# Patient Record
Sex: FEMALE | Race: WHITE | Employment: OTHER | URBAN - METROPOLITAN AREA
[De-identification: names, ages, dates, MRNs, and addresses within clinical notes are randomized per-mention and may not be internally consistent; named-entity substitution may affect disease eponyms.]

---

## 2022-08-12 ENCOUNTER — HOSPITAL ENCOUNTER (OUTPATIENT)
Facility: CLINIC | Age: 77
End: 2022-08-12
Attending: ORTHOPAEDIC SURGERY | Admitting: ORTHOPAEDIC SURGERY

## 2023-02-09 RX ORDER — MULTIVITAMIN,THER AND MINERALS
1 TABLET ORAL DAILY
COMMUNITY
End: 2023-02-09

## 2023-02-09 RX ORDER — DILTIAZEM HYDROCHLORIDE 120 MG/1
120 CAPSULE, EXTENDED RELEASE ORAL DAILY
COMMUNITY
Start: 2022-03-30

## 2023-02-09 RX ORDER — NAPROXEN SODIUM 220 MG
440 TABLET ORAL DAILY PRN
Status: ON HOLD | COMMUNITY
End: 2023-03-06

## 2023-02-09 RX ORDER — ALBUTEROL SULFATE 90 UG/1
2 AEROSOL, METERED RESPIRATORY (INHALATION) DAILY
COMMUNITY
Start: 2022-03-30

## 2023-02-09 RX ORDER — FLUTICASONE PROPIONATE AND SALMETEROL 250; 50 UG/1; UG/1
1 POWDER RESPIRATORY (INHALATION) 2 TIMES DAILY
COMMUNITY
Start: 2022-12-19

## 2023-02-09 RX ORDER — CHLORAL HYDRATE 500 MG
2 CAPSULE ORAL DAILY
COMMUNITY

## 2023-02-09 RX ORDER — MULTIVIT-MIN/IRON/FOLIC ACID/K 18-600-40
CAPSULE ORAL DAILY
COMMUNITY

## 2023-02-09 NOTE — PROVIDER NOTIFICATION
02/09/23 1217   Living Arrangements   Is your private residence a single family home or apartment? Single family home   Number of Stairs, Within Home, Primary greater than 10 stairs   Stair Railings, Within Home, Primary railings safe and in good condition   Once home, are you able to live on one level? Yes   Which level? Main Level   Bathroom Shower/Tub Walk-in shower   Support System   Do you have someone available to stay with you one or two nights once you are home? Yes   Medical Clearance   It is recommended that you call and check with any specialty providers before surgery to see if you need surgical clearance.  Do you see any specialty providers outside of your primary care provider? Yes  (pt will contact cardiology and COPD doctor)   Blood   Known Bleeding Disorder or Coagulopathy No   Does the patient have any Bahai/cultural preferences related to blood products? No   Education   Has the patient scheduled or completed pre-op total joint education, either in class or online, in the last 12 months? No   What day did the patient complete, or plan to complete, pre-op total joint education? 02/09/23  (will attempt to schedule class)   Patient attended total joint pre-op class/received pre-op teaching  online

## 2023-03-04 ENCOUNTER — NURSE TRIAGE (OUTPATIENT)
Dept: NURSING | Facility: CLINIC | Age: 78
End: 2023-03-04
Payer: MEDICARE

## 2023-03-04 NOTE — PHARMACY-ADMISSION MEDICATION HISTORY
Medication reconciliation interview completed by pre-admitting nurse     Reviewed by Barbara Reece, RN (Registered Nurse) on 02/09/23 at 1149      Reviewed by pharmacy. No further clarifications needed.       Prior to Admission medications    Medication Sig Last Dose Taking? Auth Provider Long Term End Date   albuterol (PROAIR HFA/PROVENTIL HFA/VENTOLIN HFA) 108 (90 Base) MCG/ACT inhaler Inhale 2 puffs into the lungs daily  Yes Reported, Patient Yes    diltiazem ER (DILT-XR) 120 MG 24 hr capsule Take 120 mg by mouth daily  Yes Reported, Patient Yes    fish oil-omega-3 fatty acids 1000 MG capsule Take 2 g by mouth daily  Yes Reported, Patient     fluticasone-salmeterol (ADVAIR) 250-50 MCG/ACT inhaler Inhale 1 puff into the lungs 2 times daily  Yes Reported, Patient Yes    Multiple Vitamins-Minerals (MULTIVITAMIN GUMMIES ADULTS PO) Take by mouth 2 times daily  Yes Reported, Patient     Multiple Vitamins-Minerals (PRESERVISION AREDS 2+MULTI VIT PO) Take by mouth 2 times daily  Yes Reported, Patient     naproxen sodium (ANAPROX) 220 MG tablet Take 440 mg by mouth daily as needed  Yes Reported, Patient     Vitamin D, Cholecalciferol, 25 MCG (1000 UT) TABS Take by mouth daily  Yes Reported, Patient     aspirin (ASA) 81 MG EC tablet Take 81 mg by mouth daily   Reported, Patient

## 2023-03-04 NOTE — PLAN OF CARE
Your Elective Patient:    Patient Name:  Jessica Mcdaniel    : 1945    MRN: [id number]    Surgeon: Dr Hinds    DOS/Procedure:  3/6/2023- L TKA    Hospital: [facility]    Insurance Medicare -[employer] ins.    PCP- [facility]         Assessment Findings: Will update with additional concerns from the H&P as indicated  Medical              BMI = 31.58              Covid Status-Yes              Age =77 (1pt)              A Fib - managed with ASA              COPD - inhaler - well managed         Functional              Currently Ambulation - no devices              Met score -denied any shortness of breath              Falls - 1 fall pulling a nail out and nail let go and she fell         Living Situation              Stairs to enter home -   3 steps from outside, 1 flight steps to lower level for laundry              Bathroom setup - walk-in shower, standard toilet         Post Op Support/Resources              Support - Her sister may stay with her if available              Transportation - daughter in-law will take her to the hospital - Son will take her to rehab center              Equipment available - shower seat, borrowed a walker    Discharge Disposition              TCU    1.     [facility]    2.     [facility]    3.     [facility]    4.     [facility]    5.     [facility]         Thank You!    Camille Austin RN    Trauma Coordinator         [facility]    [place]    [address] [facility] [address]   [place] [address]    T: [phone]    C. [phone]    F: [phone]    E: [email or web address]

## 2023-03-04 NOTE — TELEPHONE ENCOUNTER
Jessica is calling re: Pre surgery Covid PCR testing    She is scheduled for Rt TKA on Mon AM, 3/6/23 and will be staying overnight.  She did not realize that her Pre-op instructions from her [facility] surgeon included that she would need a Covid PCR test 4 days prior to surgery if she is to be staying overnight in the hospital.    She wants to try to get the test done with hopes that she could get results prior to Mon AM.   Notified that results can take up to 72 hrs and that she might Not have results in time.  She wants to try. She is prepared for surgery and does not want to reschedule because it took months for her to get this appointment.    5:31 pm - Spoke to Adalberto  - Covid PCR order placed  - Scheduled for soonest lab appt available near Jessica callejas [place] at [place] @ 1 pm    5:48 pm - Spoke to INES Resendez at [facility]  5:49 pm - Spoke to chantelle Carr desk at [facility] - no lab availability remaining today.    5:53 pm - Notified pt of no lab availability tonight at the [facility]  - Advised contacting [facility] to ask about possible pre-op Covid PCR testing  - Pt will check with another family member to find possible Rapid PCR testing options    Airam Villela RN  [facility] Nurse Advisors      Reason for Disposition    Question about upcoming scheduled test, no triage required and triager able to answer question    Protocols used: INFORMATION ONLY CALL - NO TRIAGE-A-

## 2023-03-05 ENCOUNTER — LAB (OUTPATIENT)
Dept: LAB | Facility: CLINIC | Age: 78
End: 2023-03-05
Payer: MEDICARE

## 2023-03-05 DIAGNOSIS — Z20.822 ENCOUNTER FOR LABORATORY TESTING FOR COVID-19 VIRUS: ICD-10-CM

## 2023-03-05 LAB — SARS-COV-2 RNA RESP QL NAA+PROBE: NEGATIVE

## 2023-03-05 PROCEDURE — U0003 INFECTIOUS AGENT DETECTION BY NUCLEIC ACID (DNA OR RNA); SEVERE ACUTE RESPIRATORY SYNDROME CORONAVIRUS 2 (SARS-COV-2) (CORONAVIRUS DISEASE [COVID-19]), AMPLIFIED PROBE TECHNIQUE, MAKING USE OF HIGH THROUGHPUT TECHNOLOGIES AS DESCRIBED BY CMS-2020-01-R: HCPCS

## 2023-03-05 PROCEDURE — U0005 INFEC AGEN DETEC AMPLI PROBE: HCPCS

## 2023-03-06 ENCOUNTER — APPOINTMENT (OUTPATIENT)
Dept: PHYSICAL THERAPY | Facility: CLINIC | Age: 78
End: 2023-03-06
Attending: ORTHOPAEDIC SURGERY
Payer: MEDICARE

## 2023-03-06 ENCOUNTER — ANESTHESIA (OUTPATIENT)
Dept: SURGERY | Facility: CLINIC | Age: 78
End: 2023-03-06
Payer: MEDICARE

## 2023-03-06 ENCOUNTER — APPOINTMENT (OUTPATIENT)
Dept: GENERAL RADIOLOGY | Facility: CLINIC | Age: 78
End: 2023-03-06
Attending: PHYSICIAN ASSISTANT
Payer: MEDICARE

## 2023-03-06 ENCOUNTER — HOSPITAL ENCOUNTER (OUTPATIENT)
Facility: CLINIC | Age: 78
Discharge: NURSING, REHAB OR LONG-TERM CARE | End: 2023-03-07
Attending: ORTHOPAEDIC SURGERY | Admitting: ORTHOPAEDIC SURGERY
Payer: MEDICARE

## 2023-03-06 ENCOUNTER — ANESTHESIA EVENT (OUTPATIENT)
Dept: SURGERY | Facility: CLINIC | Age: 78
End: 2023-03-06
Payer: MEDICARE

## 2023-03-06 DIAGNOSIS — Z96.651 TOTAL KNEE REPLACEMENT STATUS, RIGHT: Primary | ICD-10-CM

## 2023-03-06 LAB — GLUCOSE BLDC GLUCOMTR-MCNC: 94 MG/DL (ref 70–99)

## 2023-03-06 PROCEDURE — 258N000003 HC RX IP 258 OP 636: Performed by: ANESTHESIOLOGY

## 2023-03-06 PROCEDURE — 710N000009 HC RECOVERY PHASE 1, LEVEL 1, PER MIN: Performed by: ORTHOPAEDIC SURGERY

## 2023-03-06 PROCEDURE — 360N000077 HC SURGERY LEVEL 4, PER MIN: Performed by: ORTHOPAEDIC SURGERY

## 2023-03-06 PROCEDURE — 250N000013 HC RX MED GY IP 250 OP 250 PS 637: Performed by: PHYSICIAN ASSISTANT

## 2023-03-06 PROCEDURE — 97530 THERAPEUTIC ACTIVITIES: CPT | Mod: GP | Performed by: PHYSICAL THERAPIST

## 2023-03-06 PROCEDURE — 250N000025 HC SEVOFLURANE, PER MIN: Performed by: ORTHOPAEDIC SURGERY

## 2023-03-06 PROCEDURE — 250N000011 HC RX IP 250 OP 636: Performed by: ANESTHESIOLOGY

## 2023-03-06 PROCEDURE — 999N000141 HC STATISTIC PRE-PROCEDURE NURSING ASSESSMENT: Performed by: ORTHOPAEDIC SURGERY

## 2023-03-06 PROCEDURE — 250N000011 HC RX IP 250 OP 636: Performed by: PHYSICIAN ASSISTANT

## 2023-03-06 PROCEDURE — 250N000009 HC RX 250: Performed by: ORTHOPAEDIC SURGERY

## 2023-03-06 PROCEDURE — 250N000011 HC RX IP 250 OP 636: Performed by: ORTHOPAEDIC SURGERY

## 2023-03-06 PROCEDURE — 370N000017 HC ANESTHESIA TECHNICAL FEE, PER MIN: Performed by: ORTHOPAEDIC SURGERY

## 2023-03-06 PROCEDURE — C1713 ANCHOR/SCREW BN/BN,TIS/BN: HCPCS | Performed by: ORTHOPAEDIC SURGERY

## 2023-03-06 PROCEDURE — 82962 GLUCOSE BLOOD TEST: CPT

## 2023-03-06 PROCEDURE — C1776 JOINT DEVICE (IMPLANTABLE): HCPCS | Performed by: ORTHOPAEDIC SURGERY

## 2023-03-06 PROCEDURE — 250N000009 HC RX 250: Performed by: NURSE ANESTHETIST, CERTIFIED REGISTERED

## 2023-03-06 PROCEDURE — 97110 THERAPEUTIC EXERCISES: CPT | Mod: GP | Performed by: PHYSICAL THERAPIST

## 2023-03-06 PROCEDURE — 999N000065 XR KNEE PORT RIGHT 1/2 VIEWS: Mod: RT

## 2023-03-06 PROCEDURE — 250N000009 HC RX 250: Performed by: ANESTHESIOLOGY

## 2023-03-06 PROCEDURE — 250N000011 HC RX IP 250 OP 636: Performed by: NURSE ANESTHETIST, CERTIFIED REGISTERED

## 2023-03-06 PROCEDURE — 272N000001 HC OR GENERAL SUPPLY STERILE: Performed by: ORTHOPAEDIC SURGERY

## 2023-03-06 PROCEDURE — 250N000009 HC RX 250: Performed by: PHYSICIAN ASSISTANT

## 2023-03-06 PROCEDURE — 99222 1ST HOSP IP/OBS MODERATE 55: CPT | Performed by: PHYSICIAN ASSISTANT

## 2023-03-06 PROCEDURE — 258N000003 HC RX IP 258 OP 636: Performed by: NURSE ANESTHETIST, CERTIFIED REGISTERED

## 2023-03-06 PROCEDURE — 258N000003 HC RX IP 258 OP 636: Performed by: PHYSICIAN ASSISTANT

## 2023-03-06 PROCEDURE — 97161 PT EVAL LOW COMPLEX 20 MIN: CPT | Mod: GP | Performed by: PHYSICAL THERAPIST

## 2023-03-06 DEVICE — IMPLANTABLE DEVICE: Type: IMPLANTABLE DEVICE | Site: KNEE | Status: FUNCTIONAL

## 2023-03-06 DEVICE — BONE CEMENT SIMPLEX FULL DOSE 6191-1-001: Type: IMPLANTABLE DEVICE | Site: KNEE | Status: FUNCTIONAL

## 2023-03-06 DEVICE — IMP PATELLA ZIM KNEE ALL POLLY 32MM 42-5400-000-32: Type: IMPLANTABLE DEVICE | Site: KNEE | Status: FUNCTIONAL

## 2023-03-06 DEVICE — KNEE TIBIA STEM 5DEG SZ F R 42532007502: Type: IMPLANTABLE DEVICE | Site: KNEE | Status: FUNCTIONAL

## 2023-03-06 RX ORDER — AMOXICILLIN 250 MG
1 CAPSULE ORAL 2 TIMES DAILY
Status: DISCONTINUED | OUTPATIENT
Start: 2023-03-06 | End: 2023-03-07 | Stop reason: HOSPADM

## 2023-03-06 RX ORDER — FLUTICASONE FUROATE AND VILANTEROL 100; 25 UG/1; UG/1
1 POWDER RESPIRATORY (INHALATION) DAILY
Status: DISCONTINUED | OUTPATIENT
Start: 2023-03-06 | End: 2023-03-07 | Stop reason: HOSPADM

## 2023-03-06 RX ORDER — HYDROXYZINE HYDROCHLORIDE 10 MG/1
10 TABLET, FILM COATED ORAL EVERY 6 HOURS PRN
Status: DISCONTINUED | OUTPATIENT
Start: 2023-03-06 | End: 2023-03-07 | Stop reason: HOSPADM

## 2023-03-06 RX ORDER — DILTIAZEM HYDROCHLORIDE 120 MG/1
120 CAPSULE, COATED, EXTENDED RELEASE ORAL DAILY
Status: DISCONTINUED | OUTPATIENT
Start: 2023-03-07 | End: 2023-03-07 | Stop reason: HOSPADM

## 2023-03-06 RX ORDER — DIPHENHYDRAMINE HCL 12.5MG/5ML
12.5 LIQUID (ML) ORAL EVERY 6 HOURS PRN
Status: DISCONTINUED | OUTPATIENT
Start: 2023-03-06 | End: 2023-03-06 | Stop reason: HOSPADM

## 2023-03-06 RX ORDER — FENTANYL CITRATE 50 UG/ML
25 INJECTION, SOLUTION INTRAMUSCULAR; INTRAVENOUS EVERY 5 MIN PRN
Status: DISCONTINUED | OUTPATIENT
Start: 2023-03-06 | End: 2023-03-06 | Stop reason: HOSPADM

## 2023-03-06 RX ORDER — ACETAMINOPHEN 325 MG/1
975 TABLET ORAL EVERY 8 HOURS
Status: DISCONTINUED | OUTPATIENT
Start: 2023-03-06 | End: 2023-03-07 | Stop reason: HOSPADM

## 2023-03-06 RX ORDER — HYDROMORPHONE HCL IN WATER/PF 6 MG/30 ML
0.2 PATIENT CONTROLLED ANALGESIA SYRINGE INTRAVENOUS
Status: DISCONTINUED | OUTPATIENT
Start: 2023-03-06 | End: 2023-03-07 | Stop reason: HOSPADM

## 2023-03-06 RX ORDER — CALCIUM CARBONATE 500 MG/1
500 TABLET, CHEWABLE ORAL 4 TIMES DAILY PRN
Status: DISCONTINUED | OUTPATIENT
Start: 2023-03-06 | End: 2023-03-07 | Stop reason: HOSPADM

## 2023-03-06 RX ORDER — OXYCODONE HYDROCHLORIDE 5 MG/1
5 TABLET ORAL EVERY 4 HOURS PRN
Status: DISCONTINUED | OUTPATIENT
Start: 2023-03-06 | End: 2023-03-07 | Stop reason: HOSPADM

## 2023-03-06 RX ORDER — ONDANSETRON 4 MG/1
4 TABLET, ORALLY DISINTEGRATING ORAL EVERY 30 MIN PRN
Status: DISCONTINUED | OUTPATIENT
Start: 2023-03-06 | End: 2023-03-06 | Stop reason: HOSPADM

## 2023-03-06 RX ORDER — NALOXONE HYDROCHLORIDE 0.4 MG/ML
0.4 INJECTION, SOLUTION INTRAMUSCULAR; INTRAVENOUS; SUBCUTANEOUS
Status: DISCONTINUED | OUTPATIENT
Start: 2023-03-06 | End: 2023-03-07 | Stop reason: HOSPADM

## 2023-03-06 RX ORDER — SODIUM CHLORIDE, SODIUM LACTATE, POTASSIUM CHLORIDE, CALCIUM CHLORIDE 600; 310; 30; 20 MG/100ML; MG/100ML; MG/100ML; MG/100ML
INJECTION, SOLUTION INTRAVENOUS CONTINUOUS
Status: DISCONTINUED | OUTPATIENT
Start: 2023-03-06 | End: 2023-03-07 | Stop reason: HOSPADM

## 2023-03-06 RX ORDER — ACETAMINOPHEN 325 MG/1
975 TABLET ORAL ONCE
Status: DISCONTINUED | OUTPATIENT
Start: 2023-03-06 | End: 2023-03-06 | Stop reason: HOSPADM

## 2023-03-06 RX ORDER — LIDOCAINE 40 MG/G
CREAM TOPICAL
Status: DISCONTINUED | OUTPATIENT
Start: 2023-03-06 | End: 2023-03-06 | Stop reason: HOSPADM

## 2023-03-06 RX ORDER — HYDROMORPHONE HCL IN WATER/PF 6 MG/30 ML
0.2 PATIENT CONTROLLED ANALGESIA SYRINGE INTRAVENOUS EVERY 5 MIN PRN
Status: DISCONTINUED | OUTPATIENT
Start: 2023-03-06 | End: 2023-03-06 | Stop reason: HOSPADM

## 2023-03-06 RX ORDER — DIPHENHYDRAMINE HYDROCHLORIDE 50 MG/ML
12.5 INJECTION INTRAMUSCULAR; INTRAVENOUS EVERY 6 HOURS PRN
Status: DISCONTINUED | OUTPATIENT
Start: 2023-03-06 | End: 2023-03-06 | Stop reason: HOSPADM

## 2023-03-06 RX ORDER — DIAZEPAM 10 MG/2ML
2.5 INJECTION, SOLUTION INTRAMUSCULAR; INTRAVENOUS
Status: DISCONTINUED | OUTPATIENT
Start: 2023-03-06 | End: 2023-03-06 | Stop reason: HOSPADM

## 2023-03-06 RX ORDER — IBUPROFEN 600 MG/1
600 TABLET, FILM COATED ORAL EVERY 6 HOURS PRN
Status: DISCONTINUED | OUTPATIENT
Start: 2023-03-06 | End: 2023-03-07 | Stop reason: HOSPADM

## 2023-03-06 RX ORDER — ONDANSETRON 2 MG/ML
4 INJECTION INTRAMUSCULAR; INTRAVENOUS EVERY 30 MIN PRN
Status: DISCONTINUED | OUTPATIENT
Start: 2023-03-06 | End: 2023-03-06 | Stop reason: HOSPADM

## 2023-03-06 RX ORDER — FENTANYL CITRATE 50 UG/ML
INJECTION, SOLUTION INTRAMUSCULAR; INTRAVENOUS PRN
Status: DISCONTINUED | OUTPATIENT
Start: 2023-03-06 | End: 2023-03-06

## 2023-03-06 RX ORDER — AMOXICILLIN 250 MG
1-2 CAPSULE ORAL 2 TIMES DAILY
Qty: 30 TABLET | Refills: 0 | Status: SHIPPED | OUTPATIENT
Start: 2023-03-06

## 2023-03-06 RX ORDER — LABETALOL HYDROCHLORIDE 5 MG/ML
10 INJECTION, SOLUTION INTRAVENOUS
Status: DISCONTINUED | OUTPATIENT
Start: 2023-03-06 | End: 2023-03-06 | Stop reason: HOSPADM

## 2023-03-06 RX ORDER — PROPOFOL 10 MG/ML
INJECTION, EMULSION INTRAVENOUS CONTINUOUS PRN
Status: DISCONTINUED | OUTPATIENT
Start: 2023-03-06 | End: 2023-03-06

## 2023-03-06 RX ORDER — POLYETHYLENE GLYCOL 3350 17 G/17G
1 POWDER, FOR SOLUTION ORAL DAILY
Qty: 10 PACKET | Refills: 0 | Status: SHIPPED | OUTPATIENT
Start: 2023-03-06

## 2023-03-06 RX ORDER — CEFAZOLIN SODIUM/WATER 2 G/20 ML
2 SYRINGE (ML) INTRAVENOUS SEE ADMIN INSTRUCTIONS
Status: DISCONTINUED | OUTPATIENT
Start: 2023-03-06 | End: 2023-03-06 | Stop reason: HOSPADM

## 2023-03-06 RX ORDER — METOPROLOL TARTRATE 1 MG/ML
2.5 INJECTION, SOLUTION INTRAVENOUS EVERY 4 HOURS PRN
Status: DISCONTINUED | OUTPATIENT
Start: 2023-03-06 | End: 2023-03-07 | Stop reason: HOSPADM

## 2023-03-06 RX ORDER — OXYCODONE HYDROCHLORIDE 5 MG/1
5-10 TABLET ORAL EVERY 4 HOURS PRN
Qty: 30 TABLET | Refills: 0 | Status: SHIPPED | OUTPATIENT
Start: 2023-03-06

## 2023-03-06 RX ORDER — ONDANSETRON 2 MG/ML
INJECTION INTRAMUSCULAR; INTRAVENOUS PRN
Status: DISCONTINUED | OUTPATIENT
Start: 2023-03-06 | End: 2023-03-06

## 2023-03-06 RX ORDER — DIMENHYDRINATE 50 MG/ML
25 INJECTION, SOLUTION INTRAMUSCULAR; INTRAVENOUS
Status: DISCONTINUED | OUTPATIENT
Start: 2023-03-06 | End: 2023-03-06 | Stop reason: HOSPADM

## 2023-03-06 RX ORDER — HYDROMORPHONE HCL IN WATER/PF 6 MG/30 ML
0.4 PATIENT CONTROLLED ANALGESIA SYRINGE INTRAVENOUS
Status: DISCONTINUED | OUTPATIENT
Start: 2023-03-06 | End: 2023-03-07 | Stop reason: HOSPADM

## 2023-03-06 RX ORDER — TRANEXAMIC ACID 650 MG/1
1950 TABLET ORAL ONCE
Status: COMPLETED | OUTPATIENT
Start: 2023-03-06 | End: 2023-03-06

## 2023-03-06 RX ORDER — ALBUTEROL SULFATE 90 UG/1
2 AEROSOL, METERED RESPIRATORY (INHALATION) EVERY 4 HOURS PRN
Status: DISCONTINUED | OUTPATIENT
Start: 2023-03-06 | End: 2023-03-07 | Stop reason: HOSPADM

## 2023-03-06 RX ORDER — ONDANSETRON 4 MG/1
4 TABLET, ORALLY DISINTEGRATING ORAL EVERY 6 HOURS PRN
Status: DISCONTINUED | OUTPATIENT
Start: 2023-03-06 | End: 2023-03-07 | Stop reason: HOSPADM

## 2023-03-06 RX ORDER — LIDOCAINE HYDROCHLORIDE 20 MG/ML
INJECTION, SOLUTION INFILTRATION; PERINEURAL PRN
Status: DISCONTINUED | OUTPATIENT
Start: 2023-03-06 | End: 2023-03-06

## 2023-03-06 RX ORDER — ALBUTEROL SULFATE 0.83 MG/ML
2.5 SOLUTION RESPIRATORY (INHALATION) EVERY 4 HOURS PRN
Status: DISCONTINUED | OUTPATIENT
Start: 2023-03-06 | End: 2023-03-06 | Stop reason: HOSPADM

## 2023-03-06 RX ORDER — PROCHLORPERAZINE MALEATE 5 MG
5 TABLET ORAL EVERY 6 HOURS PRN
Status: DISCONTINUED | OUTPATIENT
Start: 2023-03-06 | End: 2023-03-07 | Stop reason: HOSPADM

## 2023-03-06 RX ORDER — FENTANYL CITRATE 50 UG/ML
50 INJECTION, SOLUTION INTRAMUSCULAR; INTRAVENOUS EVERY 5 MIN PRN
Status: DISCONTINUED | OUTPATIENT
Start: 2023-03-06 | End: 2023-03-06 | Stop reason: HOSPADM

## 2023-03-06 RX ORDER — LIDOCAINE 40 MG/G
CREAM TOPICAL
Status: DISCONTINUED | OUTPATIENT
Start: 2023-03-06 | End: 2023-03-07 | Stop reason: HOSPADM

## 2023-03-06 RX ORDER — HYDROMORPHONE HCL IN WATER/PF 6 MG/30 ML
0.4 PATIENT CONTROLLED ANALGESIA SYRINGE INTRAVENOUS EVERY 5 MIN PRN
Status: DISCONTINUED | OUTPATIENT
Start: 2023-03-06 | End: 2023-03-06 | Stop reason: HOSPADM

## 2023-03-06 RX ORDER — SODIUM CHLORIDE, SODIUM LACTATE, POTASSIUM CHLORIDE, CALCIUM CHLORIDE 600; 310; 30; 20 MG/100ML; MG/100ML; MG/100ML; MG/100ML
INJECTION, SOLUTION INTRAVENOUS CONTINUOUS PRN
Status: DISCONTINUED | OUTPATIENT
Start: 2023-03-06 | End: 2023-03-06

## 2023-03-06 RX ORDER — OXYCODONE HYDROCHLORIDE 10 MG/1
10 TABLET ORAL EVERY 4 HOURS PRN
Status: DISCONTINUED | OUTPATIENT
Start: 2023-03-06 | End: 2023-03-07 | Stop reason: HOSPADM

## 2023-03-06 RX ORDER — POLYETHYLENE GLYCOL 3350 17 G/17G
17 POWDER, FOR SOLUTION ORAL DAILY
Status: DISCONTINUED | OUTPATIENT
Start: 2023-03-07 | End: 2023-03-07 | Stop reason: HOSPADM

## 2023-03-06 RX ORDER — ONDANSETRON 2 MG/ML
4 INJECTION INTRAMUSCULAR; INTRAVENOUS EVERY 6 HOURS PRN
Status: DISCONTINUED | OUTPATIENT
Start: 2023-03-06 | End: 2023-03-07 | Stop reason: HOSPADM

## 2023-03-06 RX ORDER — PROPOFOL 10 MG/ML
INJECTION, EMULSION INTRAVENOUS PRN
Status: DISCONTINUED | OUTPATIENT
Start: 2023-03-06 | End: 2023-03-06

## 2023-03-06 RX ORDER — NALOXONE HYDROCHLORIDE 0.4 MG/ML
0.2 INJECTION, SOLUTION INTRAMUSCULAR; INTRAVENOUS; SUBCUTANEOUS
Status: DISCONTINUED | OUTPATIENT
Start: 2023-03-06 | End: 2023-03-07 | Stop reason: HOSPADM

## 2023-03-06 RX ORDER — ACETAMINOPHEN 325 MG/1
975 TABLET ORAL ONCE
Status: COMPLETED | OUTPATIENT
Start: 2023-03-06 | End: 2023-03-06

## 2023-03-06 RX ORDER — HYDRALAZINE HYDROCHLORIDE 20 MG/ML
2.5-5 INJECTION INTRAMUSCULAR; INTRAVENOUS EVERY 10 MIN PRN
Status: DISCONTINUED | OUTPATIENT
Start: 2023-03-06 | End: 2023-03-06 | Stop reason: HOSPADM

## 2023-03-06 RX ORDER — HALOPERIDOL 5 MG/ML
1 INJECTION INTRAMUSCULAR
Status: DISCONTINUED | OUTPATIENT
Start: 2023-03-06 | End: 2023-03-06 | Stop reason: HOSPADM

## 2023-03-06 RX ORDER — BISACODYL 10 MG
10 SUPPOSITORY, RECTAL RECTAL DAILY PRN
Status: DISCONTINUED | OUTPATIENT
Start: 2023-03-06 | End: 2023-03-07 | Stop reason: HOSPADM

## 2023-03-06 RX ORDER — ACETAMINOPHEN 325 MG/1
650 TABLET ORAL EVERY 4 HOURS PRN
Qty: 100 TABLET | Refills: 0 | Status: SHIPPED | OUTPATIENT
Start: 2023-03-06

## 2023-03-06 RX ORDER — VANCOMYCIN HYDROCHLORIDE 1 G/20ML
INJECTION, POWDER, LYOPHILIZED, FOR SOLUTION INTRAVENOUS PRN
Status: DISCONTINUED | OUTPATIENT
Start: 2023-03-06 | End: 2023-03-06 | Stop reason: HOSPADM

## 2023-03-06 RX ORDER — CEFAZOLIN SODIUM/WATER 2 G/20 ML
2 SYRINGE (ML) INTRAVENOUS
Status: COMPLETED | OUTPATIENT
Start: 2023-03-06 | End: 2023-03-06

## 2023-03-06 RX ORDER — TRANEXAMIC ACID 10 MG/ML
1 INJECTION, SOLUTION INTRAVENOUS ONCE
Status: COMPLETED | OUTPATIENT
Start: 2023-03-06 | End: 2023-03-06

## 2023-03-06 RX ORDER — SODIUM CHLORIDE, SODIUM LACTATE, POTASSIUM CHLORIDE, CALCIUM CHLORIDE 600; 310; 30; 20 MG/100ML; MG/100ML; MG/100ML; MG/100ML
INJECTION, SOLUTION INTRAVENOUS CONTINUOUS
Status: DISCONTINUED | OUTPATIENT
Start: 2023-03-06 | End: 2023-03-06 | Stop reason: HOSPADM

## 2023-03-06 RX ORDER — METHYLPREDNISOLONE ACETATE 40 MG/ML
INJECTION, SUSPENSION INTRA-ARTICULAR; INTRALESIONAL; INTRAMUSCULAR; SOFT TISSUE PRN
Status: DISCONTINUED | OUTPATIENT
Start: 2023-03-06 | End: 2023-03-06

## 2023-03-06 RX ORDER — ASPIRIN 81 MG/1
81 TABLET ORAL DAILY
COMMUNITY

## 2023-03-06 RX ORDER — DEXAMETHASONE SODIUM PHOSPHATE 4 MG/ML
INJECTION, SOLUTION INTRA-ARTICULAR; INTRALESIONAL; INTRAMUSCULAR; INTRAVENOUS; SOFT TISSUE PRN
Status: DISCONTINUED | OUTPATIENT
Start: 2023-03-06 | End: 2023-03-06

## 2023-03-06 RX ORDER — CEFAZOLIN SODIUM 2 G/100ML
2 INJECTION, SOLUTION INTRAVENOUS EVERY 8 HOURS
Status: COMPLETED | OUTPATIENT
Start: 2023-03-06 | End: 2023-03-07

## 2023-03-06 RX ORDER — KETOROLAC TROMETHAMINE 30 MG/ML
INJECTION, SOLUTION INTRAMUSCULAR; INTRAVENOUS PRN
Status: DISCONTINUED | OUTPATIENT
Start: 2023-03-06 | End: 2023-03-06

## 2023-03-06 RX ORDER — HYDROXYZINE HYDROCHLORIDE 10 MG/1
10 TABLET, FILM COATED ORAL EVERY 6 HOURS PRN
Qty: 30 TABLET | Refills: 0 | Status: SHIPPED | OUTPATIENT
Start: 2023-03-06

## 2023-03-06 RX ORDER — BUPIVACAINE HYDROCHLORIDE AND EPINEPHRINE 5; 5 MG/ML; UG/ML
INJECTION, SOLUTION PERINEURAL PRN
Status: DISCONTINUED | OUTPATIENT
Start: 2023-03-06 | End: 2023-03-06

## 2023-03-06 RX ORDER — IPRATROPIUM BROMIDE AND ALBUTEROL SULFATE 2.5; .5 MG/3ML; MG/3ML
3 SOLUTION RESPIRATORY (INHALATION)
Status: DISCONTINUED | OUTPATIENT
Start: 2023-03-06 | End: 2023-03-06

## 2023-03-06 RX ADMIN — PROPOFOL 150 MG: 10 INJECTION, EMULSION INTRAVENOUS at 08:46

## 2023-03-06 RX ADMIN — PHENYLEPHRINE HYDROCHLORIDE 100 MCG: 10 INJECTION INTRAVENOUS at 09:23

## 2023-03-06 RX ADMIN — OXYCODONE HYDROCHLORIDE 5 MG: 5 TABLET ORAL at 11:44

## 2023-03-06 RX ADMIN — TRANEXAMIC ACID 1 G: 10 INJECTION, SOLUTION INTRAVENOUS at 09:54

## 2023-03-06 RX ADMIN — KETOROLAC TROMETHAMINE 15 MG: 30 INJECTION, SOLUTION INTRAMUSCULAR at 08:46

## 2023-03-06 RX ADMIN — SODIUM CHLORIDE, POTASSIUM CHLORIDE, SODIUM LACTATE AND CALCIUM CHLORIDE: 600; 310; 30; 20 INJECTION, SOLUTION INTRAVENOUS at 08:43

## 2023-03-06 RX ADMIN — BUPIVACAINE HYDROCHLORIDE AND EPINEPHRINE BITARTRATE 20 ML: 5; .005 INJECTION, SOLUTION PERINEURAL at 08:02

## 2023-03-06 RX ADMIN — METHYLPREDNISOLONE ACETATE 40 MG: 40 INJECTION, SUSPENSION INTRA-ARTICULAR; INTRALESIONAL; INTRAMUSCULAR; SOFT TISSUE at 08:01

## 2023-03-06 RX ADMIN — TRANEXAMIC ACID 1950 MG: 650 TABLET ORAL at 07:07

## 2023-03-06 RX ADMIN — PHENYLEPHRINE HYDROCHLORIDE 100 MCG: 10 INJECTION INTRAVENOUS at 09:35

## 2023-03-06 RX ADMIN — DEXAMETHASONE SODIUM PHOSPHATE 8 MG: 4 INJECTION, SOLUTION INTRA-ARTICULAR; INTRALESIONAL; INTRAMUSCULAR; INTRAVENOUS; SOFT TISSUE at 08:46

## 2023-03-06 RX ADMIN — PHENYLEPHRINE HYDROCHLORIDE 200 MCG: 10 INJECTION INTRAVENOUS at 09:32

## 2023-03-06 RX ADMIN — PHENYLEPHRINE HYDROCHLORIDE 100 MCG: 10 INJECTION INTRAVENOUS at 09:44

## 2023-03-06 RX ADMIN — ONDANSETRON 4 MG: 2 INJECTION INTRAMUSCULAR; INTRAVENOUS at 08:46

## 2023-03-06 RX ADMIN — PROPOFOL 70 MG: 10 INJECTION, EMULSION INTRAVENOUS at 09:05

## 2023-03-06 RX ADMIN — SODIUM CHLORIDE, POTASSIUM CHLORIDE, SODIUM LACTATE AND CALCIUM CHLORIDE: 600; 310; 30; 20 INJECTION, SOLUTION INTRAVENOUS at 11:14

## 2023-03-06 RX ADMIN — FENTANYL CITRATE 100 MCG: 50 INJECTION, SOLUTION INTRAMUSCULAR; INTRAVENOUS at 07:58

## 2023-03-06 RX ADMIN — MIDAZOLAM 2 MG: 1 INJECTION INTRAMUSCULAR; INTRAVENOUS at 07:58

## 2023-03-06 RX ADMIN — LIDOCAINE HYDROCHLORIDE 40 MG: 20 INJECTION, SOLUTION INFILTRATION; PERINEURAL at 08:46

## 2023-03-06 RX ADMIN — PHENYLEPHRINE HYDROCHLORIDE 100 MCG: 10 INJECTION INTRAVENOUS at 08:56

## 2023-03-06 RX ADMIN — CEFAZOLIN SODIUM 2 G: 2 INJECTION, SOLUTION INTRAVENOUS at 15:32

## 2023-03-06 RX ADMIN — OXYCODONE HYDROCHLORIDE 5 MG: 5 TABLET ORAL at 11:14

## 2023-03-06 RX ADMIN — FLUTICASONE FUROATE AND VILANTEROL TRIFENATATE 1 PUFF: 100; 25 POWDER RESPIRATORY (INHALATION) at 21:39

## 2023-03-06 RX ADMIN — SODIUM CHLORIDE, POTASSIUM CHLORIDE, SODIUM LACTATE AND CALCIUM CHLORIDE: 600; 310; 30; 20 INJECTION, SOLUTION INTRAVENOUS at 21:27

## 2023-03-06 RX ADMIN — SODIUM CHLORIDE, POTASSIUM CHLORIDE, SODIUM LACTATE AND CALCIUM CHLORIDE: 600; 310; 30; 20 INJECTION, SOLUTION INTRAVENOUS at 07:43

## 2023-03-06 RX ADMIN — PHENYLEPHRINE HYDROCHLORIDE 100 MCG: 10 INJECTION INTRAVENOUS at 09:48

## 2023-03-06 RX ADMIN — ACETAMINOPHEN 975 MG: 325 TABLET ORAL at 15:32

## 2023-03-06 RX ADMIN — Medication 2 G: at 08:43

## 2023-03-06 RX ADMIN — PROPOFOL 50 MCG/KG/MIN: 10 INJECTION, EMULSION INTRAVENOUS at 08:46

## 2023-03-06 RX ADMIN — SENNOSIDES AND DOCUSATE SODIUM 1 TABLET: 50; 8.6 TABLET ORAL at 21:34

## 2023-03-06 RX ADMIN — BUPIVACAINE HYDROCHLORIDE AND EPINEPHRINE BITARTRATE 10 ML: 5; .005 INJECTION, SOLUTION PERINEURAL at 08:07

## 2023-03-06 RX ADMIN — ACETAMINOPHEN 975 MG: 325 TABLET ORAL at 07:07

## 2023-03-06 RX ADMIN — ROCURONIUM BROMIDE 10 MG: 50 INJECTION, SOLUTION INTRAVENOUS at 08:46

## 2023-03-06 RX ADMIN — HYDROMORPHONE HYDROCHLORIDE 1 MG: 1 INJECTION, SOLUTION INTRAMUSCULAR; INTRAVENOUS; SUBCUTANEOUS at 08:46

## 2023-03-06 ASSESSMENT — ENCOUNTER SYMPTOMS: DYSRHYTHMIAS: 1

## 2023-03-06 ASSESSMENT — ACTIVITIES OF DAILY LIVING (ADL)
ADLS_ACUITY_SCORE: 22
ADLS_ACUITY_SCORE: 22
ADLS_ACUITY_SCORE: 24
ADLS_ACUITY_SCORE: 23

## 2023-03-06 ASSESSMENT — COPD QUESTIONNAIRES
COPD: 1
CAT_SEVERITY: MODERATE

## 2023-03-06 NOTE — CONSULTS
Care Management Follow Up    Length of Stay (days): 0    Expected Discharge Date: 03/07/2023     Concerns to be Addressed:       Patient plan of care discussed at interdisciplinary rounds: Yes    Referrals Placed by CM/SW:    Private pay costs discussed: Not applicable    Additional Information:    Pt has a [facility] care plan. [facility], [facility], and [facility] per care plan. Pt son will provide transport upon discharge. Pt prefers a private room and is aware of potential cost. Will need to resend referral once therapy notes are in.     IVETT Reilly, [facility]  Inpatient Care Coordination  Ortho/Spine Unit  [phone]  Antonella Swenson, SW

## 2023-03-06 NOTE — BRIEF OP NOTE
TKR for Pre/Post OA knee  Guzman  TT est 35 w  (see dictation for full)  Spec none  No anticipated complications

## 2023-03-06 NOTE — OP NOTE
Procedure Date: 03/06/2023    PREOPERATIVE DIAGNOSES:    1.  Osteoarthritis, right knee.  2.  Peripheral edema, right lower extremity.    POSTOPERATIVE DIAGNOSES:    1.  Osteoarthritis, right knee.  2.  Peripheral edema, right lower extremity.    PROCEDURE PERFORMED:  Right total knee arthroplasty.    SURGEON:  Jimmy Hinds M.D.     ASSISTANT:  Adriane Mcghee PA-C    ANESTHESIA:  General with adductor block.    ESTIMATED BLOOD LOSS:  50 mL    TOURNIQUET TIME:  38 minutes.    COMPLICATIONS:  None.    DESCRIPTION OF PROCEDURE:  The patient was taken to the operating room where after administration of antibiotic prophylaxis, tranexamic acid and sterile prep and drape, the leg was exsanguinated and an anterior incision was made, followed by a medial arthrotomy with poor distal medial soft tissue thickness.  An intramedullary 5-degree guide was used with anterior referencing at 5 degrees of external rotation, which best matched the epicondylar axis, and a box cut was made for PCL substitution.  Retractors were carefully placed about the proximal tibia and a cut was made perpendicular to the mechanical axis of the tibia, removing no bone from the posteromedial tibia.  Medial osteophytes were removed.  Posterior osteophytes were also removed under direct vision with the knee in hyperflexion and a capsular injection was performed.  Eight mm was resected from the undersurface of a 21 mm patella and sized appropriately.  Capsular injection was performed.  Gaps were equal.    After copious lavage and drying, Simplex cementing was performed for a Surinder Persona size 9 standard with cruciate substituting femur, size F tibia, 12 mm polyethylene insert, 32 mm patellar button.  Range of motion, balance and tracking all appeared excellent with no laxity at any point of the flexion arc.  Copious additional lavage was performed followed by a Betadine soak and 1 gram of vancomycin powder due to her distal edema.  Layered  anatomic closure was then accomplished.  There were no complications.    Jimmy Hinds MD        D: 2023   T: 2023   MT: HERMELINDO    Name:     DESMOND VITALE  MRN:      [id number]        Account:        [id number]   :      1945           Procedure Date: 2023     Document: [id number]

## 2023-03-06 NOTE — ANESTHESIA CARE TRANSFER NOTE
Patient: Jessica Mcdaniel    Procedure: Procedure(s):  Right total knee arthroplasty(Spinal with adductor canal block)       Diagnosis: Osteoarthritis [M19.90]  Diagnosis Additional Information: No value filed.    Anesthesia Type:   General     Note:    Oropharynx: oropharynx clear of all foreign objects and spontaneously breathing  Level of Consciousness: awake  Oxygen Supplementation: face mask    Independent Airway: airway patency satisfactory and stable  Dentition: dentition unchanged  Vital Signs Stable: post-procedure vital signs reviewed and stable  Report to RN Given: handoff report given  Patient transferred to: PACU  Comments: Report and signed off to RN in PACU.  Good Resps, skin pink, VSS, O2 via face tent.  Handoff Report: Identifed the Patient, Identified the Reponsible Provider, Reviewed the pertinent medical history, Discussed the surgical course, Reviewed Intra-OP anesthesia mangement and issues during anesthesia, Set expectations for post-procedure period and Allowed opportunity for questions and acknowledgement of understanding      Vitals:  Vitals Value Taken Time   /99 03/06/23 1016   Temp     Pulse 129 03/06/23 1019   Resp 11 03/06/23 1019   SpO2 100 % 03/06/23 1019   Vitals shown include unvalidated device data.    Electronically Signed By: SUMAN Pearson CRNA  March 6, 2023  10:20 AM

## 2023-03-06 NOTE — ANESTHESIA PREPROCEDURE EVALUATION
Anesthesia Pre-Procedure Evaluation    Patient: Jessica Mcdaniel   MRN: [id number] : 1945        Procedure : Procedure(s):  Right total knee arthroplasty(Spinal with adductor canal block)          Past Medical History:   Diagnosis Date     Arrhythmia     afib     COPD (chronic obstructive pulmonary disease) (H)      Diabetes (H)     Pre-diabetes      Past Surgical History:   Procedure Laterality Date     BREAST SURGERY Right     Biopsy     CHOLECYSTECTOMY  2018     ORTHOPEDIC SURGERY Bilateral 2022    ORIF elbow right arm and radius fracture wrist left      Allergies   Allergen Reactions     Sulfa Drugs GI Disturbance, Other (See Comments) and Nausea     Abdominal pain      Social History     Tobacco Use     Smoking status: Former     Types: Cigarettes     Quit date:      Years since quittin.1     Smokeless tobacco: Never   Substance Use Topics     Alcohol use: Yes     Comment: rarely      Wt Readings from Last 1 Encounters:   23 84 kg (185 lb 3.2 oz)        Anesthesia Evaluation   Pt has had prior anesthetic. Type: General.    No history of anesthetic complications       ROS/MED HX  ENT/Pulmonary:     (+) moderate,  COPD,     Neurologic:  - neg neurologic ROS     Cardiovascular:     (+) hypertension-----dysrhythmias, a-fib,     METS/Exercise Tolerance:     Hematologic:  - neg hematologic  ROS     Musculoskeletal:   (+) arthritis,     GI/Hepatic:  - neg GI/hepatic ROS     Renal/Genitourinary:  - neg Renal ROS     Endo: Comment: Class `1 obesity    (+) type II DM, Not using insulin, - not using insulin pump. Obesity,     Psychiatric/Substance Use:  - neg psychiatric ROS     Infectious Disease:  - neg infectious disease ROS     Malignancy:  - neg malignancy ROS     Other:  - neg other ROS          Physical Exam    Airway        Mallampati: II   TM distance: > 3 FB   Neck ROM: full   Mouth opening: > 3 cm    Respiratory Devices and Support         Dental       (+) Modest Abnormalities -  crowns, retainers, 1 or 2 missing teeth      Cardiovascular   cardiovascular exam normal       Rhythm and rate: regular and normal     Pulmonary   pulmonary exam normal        breath sounds clear to auscultation       Other findings: No lab results found.   Lab Test        03/06/23 0628          GLC          94                   EKG Interpretation:   AF with no other abnl    OUTSIDE LABS:  CBC: No results found for: WBC, HGB, HCT, PLT  BMP:   Lab Results   Component Value Date    GLC 94 03/06/2023     COAGS: No results found for: PTT, INR, FIBR  POC: No results found for: BGM, HCG, HCGS  HEPATIC: No results found for: ALBUMIN, PROTTOTAL, ALT, AST, GGT, ALKPHOS, BILITOTAL, BILIDIRECT, KASIE  OTHER: No results found for: PH, LACT, A1C, BAUDILIO, PHOS, MAG, LIPASE, AMYLASE, TSH, T4, T3, CRP, SED    Anesthesia Plan    ASA Status:  3   NPO Status:  NPO Appropriate    Anesthesia Type: General.     - Airway: LMA   Induction: Intravenous.   Maintenance: Balanced.        Consents    Anesthesia Plan(s) and associated risks, benefits, and realistic alternatives discussed. Questions answered and patient/representative(s) expressed understanding.     - Discussed: Risks, Benefits and Alternatives for BOTH SEDATION and the PROCEDURE were discussed     - Discussed with:  Patient      - Extended Intubation/Ventilatory Support Discussed: No.      - Patient is DNR/DNI Status: No    Use of blood products discussed: No .     Postoperative Care    Pain management: IV analgesics, Oral pain medications, Peripheral nerve block (Continuous), Multi-modal analgesia.   PONV prophylaxis: Ondansetron (or other 5HT-3), Dexamethasone or Solumedrol, Background Propofol Infusion     Comments:                Aly Lees MD

## 2023-03-06 NOTE — ANESTHESIA PROCEDURE NOTES
Other (IPAKS) Procedure Note    Pre-Procedure   Staff -        Anesthesiologist:  Aly Lees MD       Performed By: anesthesiologist       Referred By: mark       Location: pre-op       Pre-Anesthestic Checklist: patient identified, IV checked, site marked, risks and benefits discussed, informed consent, monitors and equipment checked, pre-op evaluation, at physician/surgeon's request and post-op pain management  Timeout:       Correct Patient: Yes        Correct Procedure: Yes        Correct Site: Yes        Correct Position: Yes        Correct Laterality: Yes        Site Marked: Yes  Procedure Documentation  Procedure: Other (IPAKS)       Laterality: right       Patient Position: LLD       Patient Prep/Sterile Barriers: sterile gloves, mask       Skin prep: Betadine       Local skin infiltrated with 0.3 mL of 1% lidocaine.        Needle Type: insulated and short bevel       Needle Gauge: 21.        Needle Length (Inches): 4        Ultrasound guided       1. Ultrasound was used to identify targeted nerve, plexus, vascular marker, or fascial plane and place a needle adjacent to it in real-time.       2. Ultrasound was used to visualize the spread of anesthetic in close proximity to the above referenced structure.       4. The visualized anatomic structures appeared normal.       5. There were no apparent abnormal pathologic findings.    Assessment/Narrative         The placement was negative for: blood aspirated, painful injection and site bleeding       Paresthesias: No.       Test dose of 3 mL at.        .       Bolus given via needle..        Secured via.        Insertion/Infusion Method: Single Shot       Complications: none       Injection made incrementally with aspirations every 5 mL.     Comments:  The surgeon has given a written order transferring care of this patient to me for the performance of a regional analgesia block for post-op pain control. It is requested of me because I am  "uniquely trained and qualified to perform this block and the surgeon is neither trained nor qualified to perform this procedure.         FOR [facility] ONLY:   Pain Team Contact information: please page the Pain Team Via [employer] Search \"Pain\". During daytime hours, please page the attending first. At night please page the resident first.    "

## 2023-03-06 NOTE — CONSULTS
[facility]  Hospitalist Consult Note  Name: Jessica Mcdaniel    MRN: [id number]  YOB: 1945    Age: 77 year old  Date of admission: 3/6/2023  Primary care provider: Reggie Martinez     Requesting Physician:  Taz SAHU  Reason for consult:  Post-operative medical management         Assessment and Plan:   Jessica Mcdaniel is a 77 year old female with a history of atrial fibrillation not on anticoagulation, COPD, HLD, who was admitted for right TKA.    DJD s/p R TKA on 3/6/2023: The patient is doing well, currently has well controlled pain and is hemodynamically stable. Will defer diet, activity, DVT prophylaxis, and pain control to the primary team. Currently the patient is on Tylenol, oxycodone. Continue physical and occupational therapy. Social work consulted for possible placement needs. Continue incentive spirometry and check hemoglobin to evaluate for surgical blood loss and potential need for transfusion.     Chronic atrial fibrillation not on anticoagulation  HLD  Rate controlled by exam. Per discussion with patient and review of cardiology and PCP notes patient was recommended to start on anticoagulation which she has refused and has had an extensive discussion with cardiologist about it is aware of the risks and was on baby aspirin prior to hospitalization for surgery.  -If she is agreeable I would recommend full dose anticoagulation in the form of DOAC until return to normal mobility  -Continue rate control with Cardizem daily  -Follow up with cardiologist as previously directed.  -If uncontrolled resting heart rates place on telemetry    COPD  Emphysema  Appears compensated.  -Continue PTA inhalers    Prediabetes   Last a1c 5.8, would not check routine blood sugars at this time if fasting sugar above 150.  Follow-up with primary care to recheck A1c.    Code status: Full  Prophylaxis: pcd per primary   Disposition: patient states anticipates discharge to TCU    Thank you  for the consultation, we will continue to follow along during the hospitalization. Please page with any questions or concerns.         History of Present Illness:   Jessica Mcdaniel is a 77 year old female who was hospitalized for R TKA. Pre-operative note was fully reviewed and recommendations acknowledged. Op note and anesthesia notes and flow sheets reviewed.     The patient had no complications related to the procedure and has had an unremarkable post-operative course to this point. Currently pain is adequately controlled. No nausea, vomiting, diarrhea or constipation. No fevers, chills, diaphoresis. No chest pain, palpitations, dyspnea. Santoro catheter still in place. Tolerating oral intake. No excessive somnolence and patient is fully alert and oriented. The patient has no other complaints at this time.                  Past Medical History:     Past Medical History:   Diagnosis Date     Arrhythmia     afib     COPD (chronic obstructive pulmonary disease) (H)              Past Surgical History:     Past Surgical History:   Procedure Laterality Date     BREAST SURGERY Right     Biopsy     CHOLECYSTECTOMY  2018     ORTHOPEDIC SURGERY Bilateral 2022    ORIF elbow right arm and radius fracture wrist left               Social History:     Social History     Tobacco Use     Smoking status: Former     Types: Cigarettes     Quit date:      Years since quittin.1     Smokeless tobacco: Never   Substance Use Topics     Alcohol use: Yes     Comment: rarely             Family History:   Family history was fully reviewed and non-contributory in this case.          Allergies:     Allergies   Allergen Reactions     Sulfa Drugs GI Disturbance, Other (See Comments) and Nausea     Abdominal pain             Medications:     Prior to Admission medications    Medication Sig Last Dose Taking? Auth Provider Long Term End Date   acetaminophen (TYLENOL) 325 MG tablet Take 2 tablets (650 mg) by mouth every 4 hours as  needed for other (mild pain) May use regular strength (325mg) acetaminophen over the counter medication when Rx runs out.  Yes Adriane Mcghee PA-C     albuterol (PROAIR HFA/PROVENTIL HFA/VENTOLIN HFA) 108 (90 Base) MCG/ACT inhaler Inhale 2 puffs into the lungs daily More than a month Yes Reported, Patient Yes    aspirin (ASA) 325 MG EC tablet Take 1 tablet (325 mg) by mouth daily  Yes Adriane Mcghee PA-C No    aspirin 81 MG EC tablet Take 81 mg by mouth daily 3/4/2023 Yes Reported, Patient No    diltiazem ER (DILT-XR) 120 MG 24 hr capsule Take 120 mg by mouth daily 3/6/2023 Yes Reported, Patient Yes    fish oil-omega-3 fatty acids 1000 MG capsule Take 2 g by mouth daily 2/20/2023 Yes Reported, Patient     fluticasone-salmeterol (ADVAIR) 250-50 MCG/ACT inhaler Inhale 1 puff into the lungs 2 times daily 3/6/2023 Yes Reported, Patient Yes    hydrOXYzine (ATARAX) 10 MG tablet Take 1 tablet (10 mg) by mouth every 6 hours as needed for itching or anxiety (with pain, moderate pain)  Yes Adriane Mcghee PA-C     Multiple Vitamins-Minerals (MULTIVITAMIN GUMMIES ADULTS PO) Take by mouth 2 times daily 2/20/2023 Yes Reported, Patient     Multiple Vitamins-Minerals (PRESERVISION AREDS 2+MULTI VIT PO) Take by mouth 2 times daily 2/20/2023 Yes Reported, Patient     oxyCODONE (ROXICODONE) 5 MG tablet Take 1-2 tablets (5-10 mg) by mouth every 4 hours as needed for moderate to severe pain or severe pain (7-10) Begin weaning on POD3  Yes Adriane Mcghee PA-C     polyethylene glycol (MIRALAX) 17 g packet Take 17 g by mouth daily  Yes Adriane Mcghee PA-C     senna-docusate (SENOKOT-S/PERICOLACE) 8.6-50 MG tablet Take 1-2 tablets by mouth 2 times daily Take while on oral narcotics to prevent or treat constipation.  Yes Adriane Mcghee PA-C     Vitamin D (Cholecalciferol) 25 MCG (1000 UT) TABS Take by mouth daily 2/20/2023 Yes Reported, Patient         Current hospital administered medication  "list (MAR) also reviewed.          Review of Systems:     A comprehensive greater than 10 system review of systems was carried out.  Pertinent positives and negatives are noted above.  Otherwise negative for contributory info.            Physical Exam:   Blood pressure 136/79, pulse 86, temperature 97.3  F (36.3  C), temperature source Temporal, resp. rate 16, height 1.626 m (5' 4\"), weight 89.4 kg (197 lb), SpO2 97 %.  Exam:  General: Alert, awake, no acute distress.  HEENT: Normocephalic and atraumatic, eyes anicteric and without scleral injection, EOMI, face symmetric, MMM.  Cardiac: regular rate, rhythm irregular, normal S1, S2. No m/g/r, no LE edema.  Pulmonary: Normal chest rise, normal work of breathing.  Lungs CTAB without crackles or wheezing.  Abdomen: soft, non-tender, non-distended.  Normoactive bowel sounds, no guarding or rebound tenderness.  Extremities: no deformities.  Warm, well perfused.  Skin: no rashes or lesions.  Warm and Dry.  Neuro: No focal deficits.  Speech clear.  Spontaneously moving all extremities in bed.  Psych: Alert and oriented x3. Appropriate affect.          Data:   Pre op labs, hemoglobin and renal function normal.    Aniyah Choi PA-C  [facility] Hospitalist  March 6, 2023      "

## 2023-03-06 NOTE — PROGRESS NOTES
03/06/23 1626   Appointment Info   Signing Clinician's Name / Credentials (PT) Aly Davis DPT   Rehab Comments (PT) WBAT R LE   Living Environment   Living Environment Comments Pt lives in rambler home by self; 2 EMILY without railing.   Self-Care   Usual Activity Tolerance good   Current Activity Tolerance moderate   Equipment Currently Used at Home walker, rolling;grab bar, tub/shower;grab bar, toilet;raised toilet seat;shower chair   General Information   Onset of Illness/Injury or Date of Surgery 03/06/23   Referring Physician Adriane Mcghee PA-C   Patient/Family Therapy Goals Statement (PT) To improve mobility   Pertinent History of Current Problem (include personal factors and/or comorbidities that impact the POC) Per H&P, pt is a 77 year old female with a history of atrial fibrillation not on anticoagulation, COPD, HLD, who was admitted for right TKA.   Existing Precautions/Restrictions weight bearing   Weight-Bearing Status - RLE weight-bearing as tolerated   Cognition   Affect/Mental Status (Cognition) WFL   Orientation Status (Cognition) oriented x 4   Follows Commands (Cognition) WFL   Pain Assessment   Patient Currently in Pain No   Range of Motion (ROM)   ROM Comment R LE knee flexion decreased 2/2 surgical procedure   Strength (Manual Muscle Testing)   Strength Comments able to complete B SLR   Bed Mobility   Comment, (Bed Mobility) SBA supine <> sit   Transfers   Comment, (Transfers) CGA sit <> stand with FWW   Gait/Stairs (Locomotion)   Comment, (Gait/Stairs) CGA with FWW   Balance   Balance Comments good sitting; fair+ standing with FWW   Clinical Impression   Criteria for Skilled Therapeutic Intervention Yes, treatment indicated   PT Diagnosis (PT) impaired functional mobility   Influenced by the following impairments impaired R LE ROM/strength, impaired balance   Functional limitations due to impairments impaired bed mobility, transfers, ambulation, stairs   Clinical  Presentation (PT Evaluation Complexity) Stable/Uncomplicated   Clinical Presentation Rationale Pt is medically stable   Clinical Decision Making (Complexity) low complexity   Planned Therapy Interventions (PT) balance training;bed mobility training;cryotherapy;gait training;home exercise program;neuromuscular re-education;patient/family education;ROM (range of motion);stair training;strengthening;transfer training   Anticipated Equipment Needs at Discharge (PT) walker, rolling   Risk & Benefits of therapy have been explained evaluation/treatment results reviewed;care plan/treatment goals reviewed;risks/benefits reviewed;current/potential barriers reviewed;participants voiced agreement with care plan;participants included;patient   PT Total Evaluation Time   PT Eval, Low Complexity Minutes (31482) 10   Plan of Care Review   Plan of Care Reviewed With patient   Physical Therapy Goals   PT Frequency Daily   PT Predicted Duration/Target Date for Goal Attainment 03/07/23   PT Goals Bed Mobility;Transfers;Gait;Stairs   PT: Bed Mobility Modified independent;Supine to/from sit   PT: Transfers Modified independent;Sit to/from stand;Assistive device   PT: Gait Modified independent;Assistive device;150 feet   PT: Stairs Modified independent;3 stairs   PT Discharge Planning   PT Plan progress ind with mobility, HEP   PT Rationale for DC Rec Defer to ortho- pt lives alone and needs to be ind to return. Planned for TCU at discharge per care plan.   PT Brief overview of current status Ax1 with FWW   Total Session Time   Total Session Time (sum of timed and untimed services) 10

## 2023-03-06 NOTE — ANESTHESIA POSTPROCEDURE EVALUATION
Patient: Jessica Mcdaniel    Procedure: Procedure(s):  Right total knee arthroplasty(Spinal with adductor canal block)       Anesthesia Type:  General    Note:  Disposition: Inpatient   Postop Pain Control: Uneventful            Sign Out: Well controlled pain   PONV: No   Neuro/Psych: Uneventful            Sign Out: Acceptable/Baseline neuro status   Airway/Respiratory: Uneventful            Sign Out: Acceptable/Baseline resp. status   CV/Hemodynamics: Uneventful            Sign Out: Acceptable CV status; No obvious hypovolemia; No obvious fluid overload   Other NRE: NONE   DID A NON-ROUTINE EVENT OCCUR? No           Last vitals:  Vitals Value Taken Time   /91 03/06/23 1030   Temp 97.3  F (36.3  C) 03/06/23 1015   Pulse 96 03/06/23 1035   Resp 9 03/06/23 1035   SpO2 92 % 03/06/23 1035   Vitals shown include unvalidated device data.    Electronically Signed By: Aly Lees MD  March 6, 2023  10:37 AM

## 2023-03-06 NOTE — ANESTHESIA PROCEDURE NOTES
Airway       Patient location during procedure: OR       Procedure Start/Stop Times: 3/6/2023 8:50 AM  Staff -        Performed By: anesthesiologist  Consent for Airway        Urgency: elective  Indications and Patient Condition       Indications for airway management: paulina-procedural       Induction type:intravenous       Mask difficulty assessment: 0 - not attempted    Final Airway Details       Final airway type: supraglottic airway    Supraglottic Airway Details        Type: LMA       Brand: I-Gel       LMA size: 4    Post intubation assessment        Placement verified by: capnometry, equal breath sounds and chest rise        Number of attempts at approach: 1       Secured with: commercial tube sweet       Ease of procedure: easy       Dentition: Intact    Medication(s) Administered   Medication Administration Time: 3/6/2023 8:50 AM

## 2023-03-06 NOTE — PROVIDER NOTIFICATION
Dr Lees at bedside to evaluate patient.  Heart rate Afib between 100-130's, blood pressure 140's / 90's - no orders received.

## 2023-03-06 NOTE — ANESTHESIA PROCEDURE NOTES
Adductor canal Procedure Note    Pre-Procedure   Staff -        Anesthesiologist:  Aly Lees MD       Performed By: anesthesiologist       Referred By: mark       Location: pre-op       Pre-Anesthestic Checklist: patient identified, IV checked, site marked, risks and benefits discussed, informed consent, monitors and equipment checked, pre-op evaluation, at physician/surgeon's request and post-op pain management  Timeout:       Correct Patient: Yes        Correct Procedure: Yes        Correct Site: Yes        Correct Position: Yes        Correct Laterality: Yes        Site Marked: Yes  Procedure Documentation  Procedure: Adductor canal       Laterality: right       Patient Position: supine       Patient Prep/Sterile Barriers: sterile gloves, mask       Skin prep: Betadine       Local skin infiltrated with 0.2 mL of 1% lidocaine.        Needle Type: insulated and short bevel       Needle Gauge: 21.        Needle Length (Inches): 4        Ultrasound guided       1. Ultrasound was used to identify targeted nerve, plexus, vascular marker, or fascial plane and place a needle adjacent to it in real-time.       2. Ultrasound was used to visualize the spread of anesthetic in close proximity to the above referenced structure.       4. The visualized anatomic structures appeared normal.       5. There were no apparent abnormal pathologic findings.    Assessment/Narrative         The placement was negative for: blood aspirated, painful injection and site bleeding       Paresthesias: No.       Bolus given via needle..        Secured via.        Insertion/Infusion Method: Single Shot       Complications: none       Injection made incrementally with aspirations every 5 mL.     Comments:  The surgeon has given a written order transferring care of this patient to me for the performance of a regional analgesia block for post-op pain control. It is requested of me because I am uniquely trained and qualified to  "perform this block and the surgeon is neither trained nor qualified to perform this procedure.         FOR [facility] ONLY:   Pain Team Contact information: please page the Pain Team Via [employer]. Search \"Pain\". During daytime hours, please page the attending first. At night please page the resident first.    "

## 2023-03-07 ENCOUNTER — APPOINTMENT (OUTPATIENT)
Dept: PHYSICAL THERAPY | Facility: CLINIC | Age: 78
End: 2023-03-07
Attending: ORTHOPAEDIC SURGERY
Payer: MEDICARE

## 2023-03-07 VITALS
WEIGHT: 197 LBS | HEIGHT: 64 IN | HEART RATE: 89 BPM | BODY MASS INDEX: 33.63 KG/M2 | TEMPERATURE: 97.8 F | DIASTOLIC BLOOD PRESSURE: 73 MMHG | OXYGEN SATURATION: 98 % | SYSTOLIC BLOOD PRESSURE: 135 MMHG | RESPIRATION RATE: 16 BRPM

## 2023-03-07 LAB — HGB BLD-MCNC: 11.2 G/DL (ref 11.7–15.7)

## 2023-03-07 PROCEDURE — 99231 SBSQ HOSP IP/OBS SF/LOW 25: CPT | Performed by: INTERNAL MEDICINE

## 2023-03-07 PROCEDURE — 85018 HEMOGLOBIN: CPT | Performed by: PHYSICIAN ASSISTANT

## 2023-03-07 PROCEDURE — 36415 COLL VENOUS BLD VENIPUNCTURE: CPT | Performed by: PHYSICIAN ASSISTANT

## 2023-03-07 PROCEDURE — 250N000013 HC RX MED GY IP 250 OP 250 PS 637: Performed by: PHYSICIAN ASSISTANT

## 2023-03-07 PROCEDURE — 97116 GAIT TRAINING THERAPY: CPT | Mod: GP | Performed by: PHYSICAL THERAPIST

## 2023-03-07 PROCEDURE — 250N000011 HC RX IP 250 OP 636: Performed by: PHYSICIAN ASSISTANT

## 2023-03-07 PROCEDURE — 97110 THERAPEUTIC EXERCISES: CPT | Mod: GP | Performed by: PHYSICAL THERAPIST

## 2023-03-07 RX ORDER — ASPIRIN 325 MG
325 TABLET, DELAYED RELEASE (ENTERIC COATED) ORAL DAILY
Qty: 30 TABLET | Refills: 0 | Status: SHIPPED | OUTPATIENT
Start: 2023-03-08 | End: 2023-04-07

## 2023-03-07 RX ADMIN — CEFAZOLIN SODIUM 2 G: 2 INJECTION, SOLUTION INTRAVENOUS at 00:31

## 2023-03-07 RX ADMIN — HYDROXYZINE HYDROCHLORIDE 10 MG: 10 TABLET ORAL at 00:29

## 2023-03-07 RX ADMIN — OXYCODONE HYDROCHLORIDE 5 MG: 5 TABLET ORAL at 14:26

## 2023-03-07 RX ADMIN — ACETAMINOPHEN 975 MG: 325 TABLET ORAL at 07:56

## 2023-03-07 RX ADMIN — POLYETHYLENE GLYCOL 3350 17 G: 17 POWDER, FOR SOLUTION ORAL at 07:56

## 2023-03-07 RX ADMIN — ASPIRIN 325 MG: 325 TABLET, COATED ORAL at 07:55

## 2023-03-07 RX ADMIN — SENNOSIDES AND DOCUSATE SODIUM 1 TABLET: 50; 8.6 TABLET ORAL at 07:56

## 2023-03-07 RX ADMIN — DILTIAZEM HYDROCHLORIDE 120 MG: 120 CAPSULE, COATED, EXTENDED RELEASE ORAL at 07:56

## 2023-03-07 RX ADMIN — ACETAMINOPHEN 975 MG: 325 TABLET ORAL at 00:29

## 2023-03-07 RX ADMIN — FLUTICASONE FUROATE AND VILANTEROL TRIFENATATE 1 PUFF: 100; 25 POWDER RESPIRATORY (INHALATION) at 10:50

## 2023-03-07 ASSESSMENT — ACTIVITIES OF DAILY LIVING (ADL)
ADLS_ACUITY_SCORE: 25
ADLS_ACUITY_SCORE: 23

## 2023-03-07 NOTE — PLAN OF CARE
OT: Orders received. Chart reviewed and discussed with care team.  IP OT not indicated as patient has plan to discharge to TCU.  Defer discharge recommendations to ortho team and OT to next level of care.  Will complete orders.

## 2023-03-07 NOTE — PROGRESS NOTES
Hospitalist Medicine Progress Note   [facility]       Jessica Mcdaniel is a 77 year old female with a history of atrial fibrillation not on anticoagulation, COPD, HLD, who was admitted 3/6/2023  For DJD R Knee ffor which she had  right TKA by Dr Jimmy Hinds M.D. 3/6/2023       Date of Admission:  3/6/2023  Assessment & Plan     DJD s/p R TKA on 3/6/2023:     Chronic atrial fibrillation not on anticoagulation  Patient doesn't want to be on Anticoagulant     HLD    COPD    Emphysema    Prediabetes        Plan:   Patient is being discharged today by Primary admitting team     Diet: Advance Diet as Tolerated: Regular Diet Adult  Discharge Instruction - Regular Diet Adult  Diet       Santoro Catheter: Not present  Code Status: Full Code               The patient's care was discussed with the Patient     Peter Watts MD  Hospitalist Service  [facility]    ______________________________________________________________________    Interval History     Symptoms   Denies any symptoms -  Told to take a higher dose of Aspirin for 30 days and then go back on her aspirin     Review of Systems:   She was not interested in dscussing anticoagulation     Data reviewed today: I reviewed all medications, new labs and imaging results over the last 24 hours.     Physical Exam   Vital Signs: Temp: 97.8  F (36.6  C) Temp src: Temporal BP: 135/73 Pulse: 89   Resp: 16 SpO2: 98 % O2 Device: None (Room air) Oxygen Delivery: 2 LPM  Weight: 197 lbs 0 oz      GENERAL: Patient is not in acute distress  HEENT: EOM+,Conjunctiva is clear   NECK:  no Jugular Venous distention  HEART: S1 S2 regular Rate and Rhythm, there is no murmur,   LUNGS: Respirations are  not laboured, Lungs are  clear to auscultation without Crepitations or Wheezing   ABDOMEN: Soft , there is no tenderness ,Bowel Sounds are  Positive   CNS:  Alert,  Oriented x 3, Moving all the Four Limbs     Data   Recent Labs   Lab 03/07/23  0640  03/06/23  0628   HGB 11.2*  --    GLC  --  94         No results found for this or any previous visit (from the past 24 hour(s)).

## 2023-03-07 NOTE — PLAN OF CARE
Goal Outcome Evaluation:    Patient vital signs are at baseline: Yes  Patient able to ambulate as they were prior to admission or with assist devices provided by therapies during their stay:  Yes up with assist of x1 gait belt walker  Patient MUST void prior to discharge:  Yes  Patient able to tolerate oral intake:  Yes  Pain has adequate pain control using Oral analgesics:  Yes  Does patient have an identified :  Yes  Has goal D/C date and time been discussed with patient:  Yes      Patient alert and oriented x4, vital sign stable, room air, voids ok, Oral prn Atarax and tylenol given for pain, Plan for discharge today to tcu.   Will continue to provide supportive care.

## 2023-03-07 NOTE — PROGRESS NOTES
Patient vital signs are at baseline: Yes  Patient able to ambulate as they were prior to admission or with assist devices provided by therapies during their stay:  Yes-Ax1, using gait belt, and walker.  Patient MUST void prior to discharge:  Yes  Patient able to tolerate oral intake:  Yes-regular diet.   Pain has adequate pain control using Oral analgesics:  Yes-PO oxycodone and scheduled tylenol.  Does patient have an identified :  No,  Reason:  Pt discharging to TCU.  Has goal D/C date and time been discussed with patient:  Yes     Pt A&O x4. CMS intact. Dressing CDI-ace wrap removed. TOPHER stockings applied.    Reviewed discharge instructions with patient. Questions answered. Patient discharged to TCU via son w/, discharge instructions, belongings.

## 2023-03-07 NOTE — PROGRESS NOTES
Assumed care of Pt at 1900. VSS, A&OX4. Pt reports she is starting to feel sensation in RLE, numbness/tingling present. Able to move RLE/foot well. Pedal pulses 2 +. Tolerating meals well with fair appetite. AX1 with walker/gait belt. Pt is voiding well. Pain managed with pain medications.

## 2023-03-07 NOTE — PROGRESS NOTES
Care Management Discharge Note    Discharge Date: 03/07/2023       Additional Information:  You have successfully submitted the preadmission screening (PAS) to the Senior [employer] Line on:  Created On 3/7/2023 10:21 AM  Your confirmation number is: [id number]  Results  Level of Care:  Based on the information you provided, it appears this person meets level of care for purposes of MA payment.   OBRA:  It appears this person does not need an OBRA Level II assessment.     IVETT Beverly, [facility]  Emergency Room   [phone]-Please contact the SW on the floor in which the patient is staying for any questions or concerns

## 2023-03-07 NOTE — PROGRESS NOTES
Care Management Discharge Note    Discharge Date: 03/07/2023       Discharge Disposition:  SNF    Discharge Services:      Discharge DME:      Discharge Transportation:  Son    Private pay costs discussed: Not applicable    Education Provided on the Discharge Plan:    Persons Notified of Discharge Plans:   Patient/Family in Agreement with the Plan: yes    Handoff Referral Completed: No    Additional Information:    Pt accepted to [facility] today 3/7. Orders faxed. Pt son to provide transport at 1500. Pt aware and agreeable. Facility aware and agreeable to discharge time.     IVETT Reilly, [facility]  Inpatient Care Coordination  Ortho/Spine Unit  [phone]  Antonella Swenson, [facility]

## 2023-03-07 NOTE — PLAN OF CARE
Goal Outcome Evaluation:      [id number] RN  Patient vital signs are at baseline: Yes RA afebrile VSS. Capno WNL LS clear.  Patient able to ambulate as they were prior to admission or with assist devices provided by therapies during their stay:  Yes pt up to [facility] and ambulated some steps with PT this afternoon.  Patient MUST void prior to discharge:  Yes Reason:  Pt Voided 50 ml  ml and then voided 150 ML. Will continue to monitor voiding status to make sure she is voiding with no significant retention  Patient able to tolerate oral intake:  Yes   Pain has adequate pain control using Oral analgesics:  Yes pt had block to RLE starting to feel the foot more. PACU had given her 10 mg Oxycodne but has not needed more. Scheduled Tylenol given   Does patient have an identified :  Yes family at bedside.  Has goal D/C date and time been discussed with patient:  Yes pt has plan with MD to go to TCU. PT/OT assessment again tomorrow. Social work talked with pt so plan is TCU when discharged possibly tomorrow.  Ace wrap taken off to assess skin and replaced d/t soiling it when she voided. IV Ancef. Will continue to monitor.

## 2023-03-07 NOTE — PROGRESS NOTES
"Orthopedic Surgery  3/7/2023  POD 1    S: Patient voices no unexpected ortho complaints today. Denies chest pain or shortness of breath. Numbness in leg resolved.    O: Blood pressure 122/71, pulse 89, temperature 97.8  F (36.6  C), temperature source Temporal, resp. rate 18, height 1.626 m (5' 4\"), weight 89.4 kg (197 lb), SpO2 96 %.  No results found for: HGB  No results found for: INR  I/O last 3 completed shifts:  In: 4847 [P.O.:3009; I.V.:1838]  Out: 980 [Urine:950; Blood:30]  Distal extremity CMSI bilaterally.  Calves are negative bilaterally, both soft and nontender.  The dressing is C/D/I.      A: Ms. Mcdaniel is doing well status post Procedure(s):  Right total knee arthroplasty(Spinal with adductor canal block).    P: Continue physical therapy. Continue DVT pphx with ASA due to high mobility and low risk factors for DVT. Anticipate discharge to TCU today or when bed available has referrals sent. TCU orders are in. Paper Rx in lock box.    Adriane Mcghee PA-C  [phone]  "

## 2023-03-07 NOTE — PROGRESS NOTES
SPIRITUAL HEALTH SERVICES Progress Note  [facility] Ortho/Spine Unit    Saw pt Jessica Mcdaniel per staff request for emotional support for pt upon the 50th birthday of her  son.      Patient/Family Understanding of Illness and Goals of Care - Jessica reported that she is discharging to rehab later today.      Distress and Loss -   o She became teary-eyed when she reported that today her  son Bradley would have turned 50 years old.  Jessica narrated that Bradley was a , who was killed in the line of duty in .  She shared stories about Bradley and reflected on his personality.  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  Jessica talked about Bradley's  and two children, one is in college and the other will graduate from high school.  She plans to have her other son  a pie (a birthday tradition in their family) to share with the family this evening in celebration of Bradley.  o Jessica named other distant and recent losses, including the recent deaths of two of her siblings and a sister's decline with dementia.  She lost her spouse two years before Bradley's death.      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      Meaning, Beliefs, and Spirituality - Jessica is Islam and welcomed prayer at the end of our conversation.      Plan of Care - No further plans as pt anticipates discharging later today.    Pravin Goyal M.Div., [facility]  Staff     [facility] routine referrals *[phone]  [facility] available  for emergent requests/referrals, either by paging the on-call  or by entering an ASAP/STAT consult in Epic (this will also page the on-call ).

## 2023-03-08 NOTE — PLAN OF CARE
Physical Therapy Discharge Summary    Reason for therapy discharge:    Discharged to transitional care facility.    Progress towards therapy goal(s). See goals on Care Plan in [facility] electronic health record for goal details.  Goals partially met.  Barriers to achieving goals:   discharge from facility.    Therapy recommendation(s):    Defer to ortho

## (undated) DEVICE — TOURNIQUET SGL  BLADDER 30"X4" BLUE 5921030135

## (undated) DEVICE — LINEN FULL SHEET 5511

## (undated) DEVICE — GLOVE BIOGEL PI MICRO SZ 8.5 48585

## (undated) DEVICE — STPL SKIN 35W 6.9MM  PXW35

## (undated) DEVICE — BLADE SAW SAGITTAL STRK 25X90X1.27MM HD SYS 6 6125-127-090

## (undated) DEVICE — BAG CLEAR TRASH 1.3M 39X33" P4040C

## (undated) DEVICE — SYR 03ML LL W/O NDL 309657

## (undated) DEVICE — GLOVE PROTEXIS W/NEU-THERA 7.0  2D73TE70

## (undated) DEVICE — GLOVE BIOGEL PI MICRO INDICATOR UNDERGLOVE SZ 7.0 48970

## (undated) DEVICE — PACK TOTAL KNEE BOXED LATEX FREE PO15TKFCT

## (undated) DEVICE — SU VICRYL 2-0 CT-1 27" UND J259H

## (undated) DEVICE — SET HANDPIECE INTERPULSE W/COAXIAL FAN SPRAY TIP 0210118000

## (undated) DEVICE — GLOVE BIOGEL PI SZ 8.5 40885

## (undated) DEVICE — SU VICRYL 1 CT-1 27" J341H

## (undated) DEVICE — GLOVE BIOGEL PI SZ 7.0 40870

## (undated) DEVICE — CAST PADDING 6" STERILE 9046S

## (undated) DEVICE — NDL BLUNT 18GA 1" W/O FILTER 305181

## (undated) DEVICE — DECANTER VIAL 2006S

## (undated) DEVICE — SU ETHIBOND 0 CT-1 CR 8X18" CX21D

## (undated) DEVICE — BONE CEMENT MIXEVAC III HI VAC KIT  0206-015-000

## (undated) DEVICE — SOL NACL 0.9% IRRIG 3000ML BAG 2B7477

## (undated) DEVICE — BLADE SAW SAGITTAL STRK 13X90X1.27MM HD SYS 6 6113-127-090

## (undated) DEVICE — SUCTION MANIFOLD NEPTUNE 2 SYS 4 PORT 0702-020-000

## (undated) DEVICE — LINEN ORTHO ACL PACK 5447

## (undated) DEVICE — PREP CHLORAPREP 26ML TINTED HI-LITE ORANGE 930815

## (undated) DEVICE — DRSG AQUACEL AG HYDROFIBER  3.5X10" 422605

## (undated) DEVICE — GLOVE BIOGEL PI MICRO INDICATOR UNDERGLOVE SZ 8.5 48985

## (undated) RX ORDER — GLYCOPYRROLATE 0.2 MG/ML
INJECTION INTRAMUSCULAR; INTRAVENOUS
Status: DISPENSED
Start: 2023-03-06

## (undated) RX ORDER — TRANEXAMIC ACID 10 MG/ML
INJECTION, SOLUTION INTRAVENOUS
Status: DISPENSED
Start: 2023-03-06

## (undated) RX ORDER — FENTANYL CITRATE-0.9 % NACL/PF 10 MCG/ML
PLASTIC BAG, INJECTION (ML) INTRAVENOUS
Status: DISPENSED
Start: 2023-03-06

## (undated) RX ORDER — NEOSTIGMINE METHYLSULFATE 1 MG/ML
VIAL (ML) INJECTION
Status: DISPENSED
Start: 2023-03-06

## (undated) RX ORDER — CEFAZOLIN SODIUM/WATER 2 G/20 ML
SYRINGE (ML) INTRAVENOUS
Status: DISPENSED
Start: 2023-03-06

## (undated) RX ORDER — FENTANYL CITRATE 50 UG/ML
INJECTION, SOLUTION INTRAMUSCULAR; INTRAVENOUS
Status: DISPENSED
Start: 2023-03-06

## (undated) RX ORDER — DEXAMETHASONE SODIUM PHOSPHATE 4 MG/ML
INJECTION, SOLUTION INTRA-ARTICULAR; INTRALESIONAL; INTRAMUSCULAR; INTRAVENOUS; SOFT TISSUE
Status: DISPENSED
Start: 2023-03-06

## (undated) RX ORDER — PROPOFOL 10 MG/ML
INJECTION, EMULSION INTRAVENOUS
Status: DISPENSED
Start: 2023-03-06

## (undated) RX ORDER — METHYLPREDNISOLONE ACETATE 40 MG/ML
INJECTION, SUSPENSION INTRA-ARTICULAR; INTRALESIONAL; INTRAMUSCULAR; SOFT TISSUE
Status: DISPENSED
Start: 2023-03-06

## (undated) RX ORDER — OXYCODONE HYDROCHLORIDE 5 MG/1
TABLET ORAL
Status: DISPENSED
Start: 2023-03-06

## (undated) RX ORDER — ACETAMINOPHEN 325 MG/1
TABLET ORAL
Status: DISPENSED
Start: 2023-03-06

## (undated) RX ORDER — ONDANSETRON 2 MG/ML
INJECTION INTRAMUSCULAR; INTRAVENOUS
Status: DISPENSED
Start: 2023-03-06

## (undated) RX ORDER — TRANEXAMIC ACID 650 MG/1
TABLET ORAL
Status: DISPENSED
Start: 2023-03-06

## (undated) RX ORDER — VANCOMYCIN HYDROCHLORIDE 1 G/20ML
INJECTION, POWDER, LYOPHILIZED, FOR SOLUTION INTRAVENOUS
Status: DISPENSED
Start: 2023-03-06